# Patient Record
Sex: MALE
[De-identification: names, ages, dates, MRNs, and addresses within clinical notes are randomized per-mention and may not be internally consistent; named-entity substitution may affect disease eponyms.]

---

## 2018-11-06 ENCOUNTER — HOSPITAL ENCOUNTER (OUTPATIENT)
Dept: HOSPITAL 14 - H.OPSURG | Age: 68
Discharge: HOME | End: 2018-11-06
Attending: OPHTHALMOLOGY
Payer: MEDICARE

## 2018-11-06 VITALS — SYSTOLIC BLOOD PRESSURE: 118 MMHG | HEART RATE: 56 BPM | OXYGEN SATURATION: 99 % | DIASTOLIC BLOOD PRESSURE: 78 MMHG

## 2018-11-06 VITALS — TEMPERATURE: 98 F

## 2018-11-06 VITALS — BODY MASS INDEX: 23.8 KG/M2

## 2018-11-06 VITALS — RESPIRATION RATE: 18 BRPM

## 2018-11-06 DIAGNOSIS — K29.70: ICD-10-CM

## 2018-11-06 DIAGNOSIS — K21.9: ICD-10-CM

## 2018-11-06 DIAGNOSIS — H25.812: Primary | ICD-10-CM

## 2018-11-06 DIAGNOSIS — E78.5: ICD-10-CM

## 2018-11-06 PROCEDURE — 66984 XCAPSL CTRC RMVL W/O ECP: CPT

## 2018-11-06 RX ADMIN — LIDOCAINE HYDROCHLORIDE ONE MG: 10 INJECTION, SOLUTION EPIDURAL; INFILTRATION; INTRACAUDAL; PERINEURAL at 14:57

## 2018-11-06 RX ADMIN — SODIUM CHLORIDE ONE MG: 0.9 INJECTION, SOLUTION INTRAVENOUS at 15:06

## 2018-11-06 RX ADMIN — LIDOCAINE HYDROCHLORIDE ONE MG: 10 INJECTION, SOLUTION EPIDURAL; INFILTRATION; INTRACAUDAL; PERINEURAL at 15:02

## 2018-11-06 RX ADMIN — SODIUM CHLORIDE ONE MG: 0.9 INJECTION, SOLUTION INTRAVENOUS at 15:02

## 2018-11-07 NOTE — OP
PROCEDURE DATE: 11/6/2018



SURGEON: MINDY REGAN MD



ANESTHESIOLOGIST: SABINA SILVER MD, RAMON MD



ANESTHESIA: LOCAL / IV SEDATION



PREOPERATIVE DIAGNOSIS:   CATARACT LEFT EYE.



POSTOPERATIVE DIAGNOSIS:   CATARACT LEFT EYE.



OPERATION:  CLEAR CORNEAL PHACOEMULSIFICATION WITH LENS IMPLANT LEFT EYE.



PREPARATION AND PROCEDURE:   After the patient was prepped and draped in the 
usual manner for sterile ophthalmic surgery, local IV sedation was administered;
eye seals were applied to the upper and lower lid margins and an adult wire lid 
speculum was placed within the lids. Under microsurgical control, a two-step 
clear corneal incision was made into the anterior chamber. The initial incision 
was perpendicular to the corneal plane. The second incision with the keratome 
was placed at a 45-degree angle to the first incision. One cc of one percent 
Xylocaine MPF was instilled into the anterior chamber to achieve proper 
intraocular anesthesia.  At this time, the Viscoelastic was injected into the 
anterior chamber for protection of the endothelium and for maintenance of the 
chamber depth. A 360-degree continuous curvilinear capsulorrhexis was performed 
using a pre-bent 25-gauge needle. Hydrodissection and hydrodelineation were 
performed using a Hernández cannula and balanced salt solution.  Utilizing the tip 
of the Hernández cannula, the nucleus was rotated freely within the capsular bag. A
standard one-handed phacoemulsification was utilized at this time for sculpting 
and rotating of the nucleus. The nucleus was fragmented in its entirety and 
aspirated without any consequence.  A standard I&A was carried out for the 
residual cortical material. No residual material was noted within the capsular 
bag. The posterior capsule was noted to be clear. Additional Viscoelastic was 
injected into the capsular bag in preparation for lens implantation. After this 
has been satisfactorily achieved the intraocular lens injected through the 
corneal incision into the capsular bag. The intraocular lens was manipulated 
until it was properly oriented and the Viscoelastic was evacuated from the 
capsular bag and anterior chamber.  The anterior chamber was reformed with 
balanced salt solution. The corneal incision was irrigated with BSS. The 
intraocular pressure was found to be within normal limits. This terminated the 
procedure. The speculum and lid drapes were removed.  TobraDex ophthalmic 
suspension and Pilocarpine 1% drops one drop was applied to the eye.  



POSTOPERATIVE CONDITION: The patient was brought to the Post anesthesia Recovery
area with stable vital signs.





______________________

DMINDY SHAH MD

## 2018-11-20 ENCOUNTER — HOSPITAL ENCOUNTER (OUTPATIENT)
Dept: HOSPITAL 14 - H.OPSURG | Age: 68
Discharge: HOME | End: 2018-11-20
Attending: OPHTHALMOLOGY
Payer: MEDICARE

## 2018-11-20 VITALS — SYSTOLIC BLOOD PRESSURE: 126 MMHG | DIASTOLIC BLOOD PRESSURE: 68 MMHG | TEMPERATURE: 97.4 F | HEART RATE: 68 BPM

## 2018-11-20 VITALS — OXYGEN SATURATION: 98 %

## 2018-11-20 VITALS — RESPIRATION RATE: 18 BRPM

## 2018-11-20 VITALS — BODY MASS INDEX: 23.8 KG/M2

## 2018-11-20 DIAGNOSIS — F32.9: ICD-10-CM

## 2018-11-20 DIAGNOSIS — H25.811: Primary | ICD-10-CM

## 2018-11-20 LAB
BASOPHILS # BLD AUTO: 0 K/UL (ref 0–0.2)
BASOPHILS NFR BLD: 0.7 % (ref 0–2)
BUN SERPL-MCNC: 19 MG/DL (ref 9–20)
CALCIUM SERPL-MCNC: 8.8 MG/DL (ref 8.4–10.2)
EOSINOPHIL # BLD AUTO: 0 K/UL (ref 0–0.7)
EOSINOPHIL NFR BLD: 0.5 % (ref 0–4)
ERYTHROCYTE [DISTWIDTH] IN BLOOD BY AUTOMATED COUNT: 13.8 % (ref 11.5–14.5)
GFR NON-AFRICAN AMERICAN: > 60
HGB BLD-MCNC: 13.9 G/DL (ref 12–18)
LYMPHOCYTES # BLD AUTO: 1.3 K/UL (ref 1–4.3)
LYMPHOCYTES NFR BLD AUTO: 17.5 % (ref 20–40)
MCH RBC QN AUTO: 30.9 PG (ref 27–31)
MCHC RBC AUTO-ENTMCNC: 32.6 G/DL (ref 33–37)
MCV RBC AUTO: 94.6 FL (ref 80–94)
MONOCYTES # BLD: 0.6 K/UL (ref 0–0.8)
MONOCYTES NFR BLD: 7.6 % (ref 0–10)
NEUTROPHILS # BLD: 5.4 K/UL (ref 1.8–7)
NEUTROPHILS NFR BLD AUTO: 73.7 % (ref 50–75)
NRBC BLD AUTO-RTO: 0.1 % (ref 0–0)
PLATELET # BLD: 253 K/UL (ref 130–400)
PMV BLD AUTO: 8.3 FL (ref 7.2–11.7)
RBC # BLD AUTO: 4.51 MIL/UL (ref 4.4–5.9)
WBC # BLD AUTO: 7.4 K/UL (ref 4.8–10.8)

## 2018-11-20 PROCEDURE — 66984 XCAPSL CTRC RMVL W/O ECP: CPT

## 2018-11-20 PROCEDURE — 85025 COMPLETE CBC W/AUTO DIFF WBC: CPT

## 2018-11-20 PROCEDURE — 36415 COLL VENOUS BLD VENIPUNCTURE: CPT

## 2018-11-20 PROCEDURE — 80048 BASIC METABOLIC PNL TOTAL CA: CPT

## 2018-11-21 NOTE — OP
PROCEDURE DATE: 11/20/2018



SURGEON: MINDY REGAN MD



ANESTHESIOLOGIST: HOLA JIMENES MD



ANESTHESIA: LOCAL / IV SEDATION



PREOPERATIVE DIAGNOSIS:   CATARACT RIGHT  EYE.



POSTOPERATIVE DIAGNOSIS:   CATARACT RIGHT EYE.



OPERATION:  CLEAR CORNEAL PHACOEMULSIFICATION WITH LENS IMPLANT RIGHT EYE.



PREPARATION AND PROCEDURE:   After the patient was prepped and draped in the 
usual manner for sterile ophthalmic surgery, local IV sedation was administered;
eye seals were applied to the upper and lower lid margins and an adult wire lid 
speculum was placed within the lids. Under microsurgical control, a two-step 
clear corneal incision was made into the anterior chamber. The initial incision 
was perpendicular to the corneal plane. The second incision with the keratome 
was placed at a 45-degree angle to the first incision. One cc of one percent 
Xylocaine MPF was instilled into the anterior chamber to achieve proper 
intraocular anesthesia.  At this time, the Viscoelastic was injected into the 
anterior chamber for protection of the endothelium and for maintenance of the 
chamber depth. A 360-degree continuous curvilinear capsulorrhexis was performed 
using a pre-bent 25-gauge needle. Hydrodissection and hydrodelineation were 
performed using a Hernández cannula and balanced salt solution.  Utilizing the tip 
of the Hernández cannula, the nucleus was rotated freely within the capsular bag. A
standard one-handed phacoemulsification was utilized at this time for sculpting 
and rotating of the nucleus. The nucleus was fragmented in its entirety and 
aspirated without any consequence.  A standard I&A was carried out for the 
residual cortical material. No residual material was noted within the capsular 
bag. The posterior capsule was noted to be clear. Additional Viscoelastic was 
injected into the capsular bag in preparation for lens implantation. After this 
has been satisfactorily achieved the intraocular lens injected through the 
corneal incision into the capsular bag. The intraocular lens was manipulated 
until it was properly oriented and the Viscoelastic was evacuated from the 
capsular bag and anterior chamber.  The anterior chamber was reformed with 
balanced salt solution. The corneal incision was irrigated with BSS. The 
intraocular pressure was found to be within normal limits. This terminated the 
procedure. The speculum and lid drapes were removed.  TobraDex ophthalmic 
suspension and Pilocarpine 1% drops one drop was applied to the eye.  



POSTOPERATIVE CONDITION: The patient was brought to the Post anesthesia Recovery
area with stable vital signs.





______________________

DMINDY SHAH MD